# Patient Record
(demographics unavailable — no encounter records)

---

## 2024-11-15 NOTE — PHYSICAL EXAM
[Normocephalic] : normocephalic [EOMI] : extra ocular movement intact [Supple] : supple [No Supraclavicular Adenopathy] : no supraclavicular adenopathy [No Cervical Adenopathy] : no cervical adenopathy [de-identified] : R breast/axilla/supraclavicular area: No evidence of recurrence\par   [de-identified] : L breast/axilla/supraclavicular area: No masses, discharge, or adenopathy\par

## 2024-11-15 NOTE — HISTORY OF PRESENT ILLNESS
[FreeTextEntry1] : Patient is a 58yo F who presents for breast cancer surveillance. Hx of RIGHT lumpx w/ re-excision & SNLB in 2004 (age 33 w/ Dr. Chung) for IDC. Surgical path yielded DCIS, negative final margins, 0/7LN. S/p RT and Tamoxifen x 5 years. Hx of LEFT nloc excisional bx 2/2014 (age 47) for ALH. Fhx of breast cancer in mother (age 55, BRCA neg), twin sister (age 49, BRCA neg), and paternal grandmother (age 70s). Patient is BRCA/full panel negative (tested 2021).Patient denies palpable masses, skin changes, or nipple discharge bilaterally. Of note, patient has previously declined high risk MRI screening.    JONO Lifetime Risk- ???  12/17/18: B/l MG & US- YOLIE 12/9/19: B/l MG & US- YOLIE 5/3/21: B/L MG & US- Mult benign cysts. BIRADS 2. 5/19/22: B/l MG & US- scattered fibroglandular. R stable postsurgical changes. US- b/l cysts. YOLIE. BI-RADS 2 9/6/23: B/l MG & US- heterogeneously dense. US- B/l stable circumscribed masses. BI-RADS 2 9/12/24B/L MG&US-scattered fibroglandular.  R stable postlumpectomy changes.  US-R 0.6 cm 9:00 7 CFN cyst.  BI-RADS 2

## 2024-11-15 NOTE — PAST MEDICAL HISTORY
[Menarche Age ____] : age at menarche was [unfilled] [Menopause Age____] : age at menopause was [unfilled] [History of Hormone Replacement Treatment] : has a history of hormone replacement treatment [Total Preg ___] : G[unfilled] [Live Births ___] : P[unfilled]  [Age At Live Birth ___] : Age at live birth: [unfilled] [de-identified] : HRT x 15 yrs [FreeTextEntry6] : no [FreeTextEntry7] : hx of OCP use [FreeTextEntry8] : maría

## 2024-11-25 NOTE — PAST MEDICAL HISTORY
[de-identified] : HRT x 15 yrs [FreeTextEntry5] : URINE VOLUMES CAME BACK HIGH "SIGNS OF UTI " BUT NO SYMPTOMS  [FreeTextEntry6] : no [FreeTextEntry7] : hx of OCP use [FreeTextEntry8] : maría

## 2024-11-25 NOTE — PAST MEDICAL HISTORY
[de-identified] : HRT x 15 yrs [FreeTextEntry5] : URINE VOLUMES CAME BACK HIGH "SIGNS OF UTI " BUT NO SYMPTOMS  [FreeTextEntry6] : no [FreeTextEntry7] : hx of OCP use [FreeTextEntry8] : maría

## 2024-11-25 NOTE — PHYSICAL EXAM
[de-identified] : R breast/axilla/supraclavicular area: No evidence of recurrence\par   [de-identified] : L breast/axilla/supraclavicular area: No masses, discharge, or adenopathy\par

## 2024-11-25 NOTE — HISTORY OF PRESENT ILLNESS
[FreeTextEntry1] : Patient is a 58yo F who presents for breast cancer surveillance. Hx of RIGHT lumpx w/ re-excision & SNLB in 2004 (age 33 w/ Dr. Chung) for IDC. Surgical path yielded DCIS, negative final margins, 0/7LN. S/p RT and Tamoxifen x 5 years. Hx of LEFT nloc excisional bx 2/2014 (age 47) for ALH. Fhx of breast cancer in mother (age 55, BRCA neg), twin sister (age 49, BRCA neg), and paternal grandmother (age 70s). Patient is BRCA/full panel negative (tested 2021).Patient denies palpable masses, skin changes, or nipple discharge bilaterally. Of note, patient has previously declined high risk MRI screening.   12/17/18: B/l MG & US- YOLIE 12/9/19: B/l MG & US- YOLIE 5/3/21: B/L MG & US- Mult benign cysts. BIRADS 2. 5/19/22: B/l MG & US- scattered fibroglandular. R stable postsurgical changes. US- b/l cysts. YOLIE. BI-RADS 2 9/6/23: B/l MG & US- heterogeneously dense. US- B/l stable circumscribed masses. BI-RADS 2 9/12/24B/L MG&US-scattered fibroglandular.  R stable postlumpectomy changes.  US-R 0.6 cm 9:00 7 CFN cyst.  BI-RADS 2

## 2024-11-25 NOTE — PHYSICAL EXAM
[de-identified] : R breast/axilla/supraclavicular area: No evidence of recurrence\par   [de-identified] : L breast/axilla/supraclavicular area: No masses, discharge, or adenopathy\par

## 2024-12-11 NOTE — ASSESSMENT
[FreeTextEntry1] : 1.infiltrating ductal carcinoma of the right breast: Status post lumpectomy, radiation therapy and tamoxifen x5 years. Recent mammogram done 9/24, stable. f/u with breast sx and oncology.  2.health maintenance: Recent labs reviewed.  Will repeat urine testing, no UTI symptoms. f/u with Gastro for repeat Colonoscopy, f/u with gyn for repeat pap smear. Patient counseled regarding recommendations for vaccines, seat belt safety, diet and exercise and all preventative screening.  3.Osteopenia: Advised pt of weight bearing exercises, taking calcium 600 mg and vitamin d3 400 iunits and repeat Bone density in 1 year.

## 2024-12-11 NOTE — HISTORY OF PRESENT ILLNESS
[FreeTextEntry1] : CPE [de-identified] : NATASHA TSAPLES is a 57 year F who comes in for an annual physical exam. Patient with medical history of right breast infiltrating ductal carcinoma status post lumpectomy, radiation therapy and tamoxifen.  She follows with breast surgeon, Dr.Sharon Hira Martinez at [facility] ,   She notes concerns regarding one of her sons today. Recent labs done reviewed and urine testing to be repeated, no UTI symptoms at this time.  Patient denies any cp, sob, abdominal pain, nausea, vomiting, palpitations, fever, chills, constipation, diarrhea.

## 2024-12-11 NOTE — HEALTH RISK ASSESSMENT
[Yes] : Yes [2 - 3 times a week (3 pts)] : 2 - 3  times a week (3 points) [1 or 2 (0 pts)] : 1 or 2 (0 points) [Never (0 pts)] : Never (0 points) [No] : In the past 12 months have you used drugs other than those required for medical reasons? No [0] : 2) Feeling down, depressed, or hopeless: Not at all (0) [Never] : Never [Patient reported mammogram was normal] : Patient reported mammogram was normal [Patient reported PAP Smear was normal] : Patient reported PAP Smear was normal [Patient reported bone density results were abnormal] : Patient reported bone density results were abnormal [Patient reported colonoscopy was normal] : Patient reported colonoscopy was normal [] :  [Fully functional (bathing, dressing, toileting, transferring, walking, feeding)] : Fully functional (bathing, dressing, toileting, transferring, walking, feeding) [Fully functional (using the telephone, shopping, preparing meals, housekeeping, doing laundry, using] : Fully functional and needs no help or supervision to perform IADLs (using the telephone, shopping, preparing meals, housekeeping, doing laundry, using transportation, managing medications and managing finances) [PHQ-2 Negative - No further assessment needed] : PHQ-2 Negative - No further assessment needed [I have developed a follow-up plan documented below in the note.] : I have developed a follow-up plan documented below in the note. [With Family] : lives with family [Audit-CScore] : 3 [[id number]] : 0 [EyeExamDate] : 2024 [MammogramDate] : 9/12/24 [MammogramComments] : Dr.Rosenbaum Martinez-breast Sx [PapSmearDate] : 7/2023 [PapSmearComments] : /GYN [BoneDensityDate] : 7/2023 [BoneDensityComments] : Osteopenia [ColonoscopyDate] : 2017 [ColonoscopyComments] : repeat due now, apt in 2/25 Gastro at [facility]

## 2025-02-12 NOTE — PHYSICAL EXAM
[Normal] : normal hearing, normal lips.gums, normal oropharynx [Normal Appearance] : the appearance of the neck was normal [No Respiratory Distress] : no respiratory distress [No Acc Muscle Use] : no accessory muscle use [Respiration, Rhythm And Depth] : normal respiratory rhythm and effort [Auscultation Breath Sounds / Voice Sounds] : lungs were clear to auscultation bilaterally [Heart Rate And Rhythm] : heart rate was normal and rhythm regular [Bowel Sounds] : normal bowel sounds [Abdomen Tenderness] : non-tender [Abdomen Soft] : soft [Oriented To Time, Place, And Person] : oriented to person, place, and time

## 2025-02-13 NOTE — REASON FOR VISIT
[Initial Evaluation] : an initial evaluation [FreeTextEntry1] : CRC screening, loose stools, dysphagia

## 2025-02-13 NOTE — CONSULT LETTER
[Dear  ___] : Dear  [unfilled], [Consult Letter:] : I had the pleasure of evaluating your patient, [unfilled]. [Please see my note below.] : Please see my note below. [Consult Closing:] : Thank you very much for allowing me to participate in the care of this patient.  If you have any questions, please do not hesitate to contact me. [Sincerely,] : Sincerely, [FreeTextEntry3] : Dr. Nery Larkin

## 2025-02-13 NOTE — HISTORY OF PRESENT ILLNESS
[FreeTextEntry1] : 57-year-old female with PMHx of diarrhea, abdominal pain, UTI, herpes, and BA cancer presents for initial appointment to establish care. Today, pt reports she needs a routine colonoscopy. No major concerns, but digestive changes now that she is post-menopausal. Notices that she avoids dairy and fried foods since it causes increased fecal urgency and LLQ discomfort. Intermittent dysphagia as she has gotten older, makes an effort to chew her food thoroughly. Last colonoscopy was ~6 years ago, possibly a few polyps but nothing else worth mentioning. Denies postprandial pain or discomfort. Denies hematochezia and BRBPR. During BM, she notices soft stool, not so much straining, but a mucus discharge when wiping. FHx of cholecystectomy - she still has hers with present gall stones.

## 2025-02-13 NOTE — ASSESSMENT
[FreeTextEntry1] : 57-year-old female with PMHx of diarrhea, abdominal pain, UTI, herpes, and BA cancer presents for initial appointment to establish care. Today, pt reports she needs a routine colonoscopy. No major concerns, but digestive changes now that she is post-menopausal. Notices that she avoids dairy and fried foods since it causes increased fecal urgency and LLQ discomfort. Intermittent dysphagia as she has gotten older, makes an effort to chew her food thoroughly. Last colonoscopy was ~6 years ago, possibly a few polyps but nothing else worth mentioning. Denies postprandial pain or discomfort. Denies hematochezia and BRBPR. During BM, she notices soft stool, not so much straining, but a mucus discharge when wiping. FHx of cholecystectomy - she still has hers with present gall stones.   Recommend pt schedule a colonoscopy. Pt prefers Clenpiq prep. Hold vitamins and supplements for 5 days prior to procedure. Recommend pt follow a low FODMAP diet. Pt received bowel health and low FODMAP diet handout.   The patient will proceed with a colonoscopy. I explained to the patient the risks, alternatives and benefits to a colonoscopy. Risk including but not limited to bleeding, perforation, infection adverse medication reaction. Questions were answered. Agreeable to proceed with the planned procedures.   The patient will hold NSAIDs and vitamins/supplements for 5 days prior. Otherwise continue medications as prescribed. The patient is not on diabetes medications or anticoagulation.   Patient seen and examined, overseeing documentation by Sri Hill. Will proceed with a colonoscopy. Medications as usual. Reviewed and reconciled medications, allergies, PMHx, PSHx, SocHx, FMHx. Reviewed imaging, blood work, diagnostic testing, discussed with patient. Further recommendations pending results of above work-up and evaluation.  All questions answered Call with any questions or concerns Time spent before and after visit reviewing patient's chart